# Patient Record
Sex: MALE | Race: WHITE | NOT HISPANIC OR LATINO | Employment: OTHER | URBAN - METROPOLITAN AREA
[De-identification: names, ages, dates, MRNs, and addresses within clinical notes are randomized per-mention and may not be internally consistent; named-entity substitution may affect disease eponyms.]

---

## 2023-04-04 PROBLEM — M25.511 CHRONIC RIGHT SHOULDER PAIN: Status: ACTIVE | Noted: 2023-04-04

## 2023-04-04 PROBLEM — G89.29 CHRONIC LOW BACK PAIN: Status: ACTIVE | Noted: 2023-04-04

## 2023-04-04 PROBLEM — I10 BENIGN HYPERTENSION: Status: ACTIVE | Noted: 2023-04-04

## 2023-04-04 PROBLEM — M25.50 JOINT PAIN: Status: ACTIVE | Noted: 2023-04-04

## 2023-04-04 PROBLEM — L97.929 LEG ULCER, LEFT (MULTI): Status: ACTIVE | Noted: 2023-04-04

## 2023-04-04 PROBLEM — R94.31 ABNORMAL EKG: Status: ACTIVE | Noted: 2023-04-04

## 2023-04-04 PROBLEM — R41.3 MEMORY LOSS, SHORT TERM: Status: ACTIVE | Noted: 2023-04-04

## 2023-04-04 PROBLEM — M47.816 LUMBAR SPONDYLOSIS: Status: ACTIVE | Noted: 2023-04-04

## 2023-04-04 PROBLEM — F41.1 GAD (GENERALIZED ANXIETY DISORDER): Status: ACTIVE | Noted: 2023-04-04

## 2023-04-04 PROBLEM — N40.1 PROSTATE HYPERPLASIA WITH URINARY OBSTRUCTION: Status: ACTIVE | Noted: 2023-04-04

## 2023-04-04 PROBLEM — E66.812 CLASS 2 OBESITY WITH BODY MASS INDEX (BMI) OF 36.0 TO 36.9 IN ADULT: Status: ACTIVE | Noted: 2023-04-04

## 2023-04-04 PROBLEM — M54.6 PAIN IN THORACIC SPINE: Status: ACTIVE | Noted: 2023-04-04

## 2023-04-04 PROBLEM — M54.50 CHRONIC LOW BACK PAIN: Status: ACTIVE | Noted: 2023-04-04

## 2023-04-04 PROBLEM — S06.9XAA TBI (TRAUMATIC BRAIN INJURY) (MULTI): Status: ACTIVE | Noted: 2023-04-04

## 2023-04-04 PROBLEM — M25.512 SHOULDER PAIN, LEFT: Status: ACTIVE | Noted: 2023-04-04

## 2023-04-04 PROBLEM — E55.9 VITAMIN D DEFICIENCY: Status: ACTIVE | Noted: 2023-04-04

## 2023-04-04 PROBLEM — G44.049 CHRONIC PAROXYSMAL HEMICRANIA, NOT INTRACTABLE: Status: ACTIVE | Noted: 2023-04-04

## 2023-04-04 PROBLEM — T78.40XA ALLERGIC REACTION: Status: ACTIVE | Noted: 2023-04-04

## 2023-04-04 PROBLEM — M54.16 LUMBAR RADICULOPATHY, CHRONIC: Status: ACTIVE | Noted: 2023-04-04

## 2023-04-04 PROBLEM — H90.3 ASYMMETRIC SNHL (SENSORINEURAL HEARING LOSS): Status: ACTIVE | Noted: 2023-04-04

## 2023-04-04 PROBLEM — G43.909 MIGRAINES: Status: ACTIVE | Noted: 2023-04-04

## 2023-04-04 PROBLEM — H81.09 LABYRINTHINE VERTIGO: Status: ACTIVE | Noted: 2023-04-04

## 2023-04-04 PROBLEM — G89.29 CHRONIC RIGHT SHOULDER PAIN: Status: ACTIVE | Noted: 2023-04-04

## 2023-04-04 PROBLEM — M48.062 SPINAL STENOSIS OF LUMBAR REGION WITH NEUROGENIC CLAUDICATION: Status: ACTIVE | Noted: 2023-04-04

## 2023-04-04 PROBLEM — F43.10 POSTTRAUMATIC STRESS DISORDER: Status: ACTIVE | Noted: 2023-04-04

## 2023-04-04 PROBLEM — F41.0 PANIC DISORDER: Status: ACTIVE | Noted: 2023-04-04

## 2023-04-04 PROBLEM — E66.9 CLASS 2 OBESITY WITH BODY MASS INDEX (BMI) OF 36.0 TO 36.9 IN ADULT: Status: ACTIVE | Noted: 2023-04-04

## 2023-04-04 PROBLEM — N13.8 PROSTATE HYPERPLASIA WITH URINARY OBSTRUCTION: Status: ACTIVE | Noted: 2023-04-04

## 2023-04-04 PROBLEM — H81.09 MENIERE'S VERTIGO: Status: ACTIVE | Noted: 2023-04-04

## 2023-04-04 PROBLEM — N52.9 ED (ERECTILE DYSFUNCTION): Status: ACTIVE | Noted: 2023-04-04

## 2023-04-04 PROBLEM — L29.9 PRURITUS: Status: ACTIVE | Noted: 2023-04-04

## 2023-04-04 PROBLEM — F41.8 DEPRESSION WITH ANXIETY: Status: ACTIVE | Noted: 2023-04-04

## 2023-04-04 RX ORDER — ATENOLOL 25 MG/1
12.5 TABLET ORAL EVERY OTHER DAY
COMMUNITY
Start: 2017-10-26

## 2023-04-04 RX ORDER — DIAZEPAM 2 MG/1
2 TABLET ORAL 2 TIMES DAILY PRN
COMMUNITY
Start: 2017-10-26 | End: 2023-05-08 | Stop reason: SDUPTHER

## 2023-04-04 RX ORDER — SERTRALINE HYDROCHLORIDE 100 MG/1
100 TABLET, FILM COATED ORAL DAILY
COMMUNITY
Start: 2017-10-26 | End: 2023-12-05 | Stop reason: ALTCHOICE

## 2023-04-04 RX ORDER — ACETAMINOPHEN, ASPIRIN (NSAID), AND CAFFEINE 250; 250; 65 MG/1; MG/1; MG/1
1 TABLET, FILM COATED ORAL 3 TIMES DAILY PRN
COMMUNITY
Start: 2023-01-10 | End: 2023-05-03

## 2023-04-04 RX ORDER — GABAPENTIN 300 MG/1
CAPSULE ORAL
COMMUNITY
Start: 2023-02-07 | End: 2023-12-05 | Stop reason: ALTCHOICE

## 2023-04-04 RX ORDER — DIVALPROEX SODIUM 125 MG/1
1 TABLET, DELAYED RELEASE ORAL EVERY 12 HOURS
COMMUNITY
Start: 2023-01-10 | End: 2023-12-05 | Stop reason: ALTCHOICE

## 2023-04-04 RX ORDER — NABUMETONE 500 MG/1
1000 TABLET, FILM COATED ORAL AS NEEDED
COMMUNITY
Start: 2022-01-21 | End: 2023-12-05 | Stop reason: ALTCHOICE

## 2023-04-04 RX ORDER — ERGOCALCIFEROL 1.25 MG/1
1 CAPSULE ORAL
COMMUNITY
Start: 2016-04-29 | End: 2023-12-05 | Stop reason: ALTCHOICE

## 2023-04-04 RX ORDER — TAMSULOSIN HYDROCHLORIDE 0.4 MG/1
0.8 CAPSULE ORAL NIGHTLY
COMMUNITY
Start: 2022-04-07 | End: 2023-05-03

## 2023-04-04 RX ORDER — TADALAFIL 10 MG/1
10 TABLET ORAL AS NEEDED
COMMUNITY
Start: 2022-04-07

## 2023-04-05 ENCOUNTER — OFFICE VISIT (OUTPATIENT)
Dept: PRIMARY CARE | Facility: CLINIC | Age: 63
End: 2023-04-05
Payer: COMMERCIAL

## 2023-04-05 VITALS
HEIGHT: 68 IN | HEART RATE: 76 BPM | OXYGEN SATURATION: 93 % | BODY MASS INDEX: 36.37 KG/M2 | WEIGHT: 240 LBS | SYSTOLIC BLOOD PRESSURE: 120 MMHG | DIASTOLIC BLOOD PRESSURE: 77 MMHG | TEMPERATURE: 97.3 F

## 2023-04-05 DIAGNOSIS — R14.0 NON-GASEOUS ABDOMINAL DISTENTION: Primary | ICD-10-CM

## 2023-04-05 DIAGNOSIS — L97.919 ULCER OF RIGHT LOWER EXTREMITY, UNSPECIFIED ULCER STAGE (MULTI): ICD-10-CM

## 2023-04-05 DIAGNOSIS — R63.5 WEIGHT GAIN, ABNORMAL: ICD-10-CM

## 2023-04-05 DIAGNOSIS — R73.9 HYPERGLYCEMIA: ICD-10-CM

## 2023-04-05 PROCEDURE — 99214 OFFICE O/P EST MOD 30 MIN: CPT | Performed by: INTERNAL MEDICINE

## 2023-04-05 PROCEDURE — 3074F SYST BP LT 130 MM HG: CPT | Performed by: INTERNAL MEDICINE

## 2023-04-05 PROCEDURE — 1036F TOBACCO NON-USER: CPT | Performed by: INTERNAL MEDICINE

## 2023-04-05 PROCEDURE — 3078F DIAST BP <80 MM HG: CPT | Performed by: INTERNAL MEDICINE

## 2023-04-05 RX ORDER — MUPIROCIN CALCIUM 20 MG/G
CREAM TOPICAL 2 TIMES DAILY
Qty: 30 G | Refills: 2 | Status: SHIPPED | OUTPATIENT
Start: 2023-04-05 | End: 2023-04-20

## 2023-04-05 RX ORDER — SULFAMETHOXAZOLE AND TRIMETHOPRIM 800; 160 MG/1; MG/1
1 TABLET ORAL 2 TIMES DAILY
COMMUNITY
Start: 2022-07-18 | End: 2023-05-03 | Stop reason: ALTCHOICE

## 2023-04-05 RX ORDER — FINASTERIDE 5 MG/1
1 TABLET, FILM COATED ORAL DAILY
COMMUNITY
Start: 2022-05-04 | End: 2023-12-05 | Stop reason: ALTCHOICE

## 2023-04-05 RX ORDER — HYDROXYZINE HYDROCHLORIDE 25 MG/1
25 TABLET, FILM COATED ORAL NIGHTLY
COMMUNITY
Start: 2022-11-01 | End: 2023-12-05 | Stop reason: ALTCHOICE

## 2023-04-05 RX ORDER — PREDNISONE 20 MG/1
TABLET ORAL
COMMUNITY
Start: 2022-07-18 | End: 2023-05-03 | Stop reason: ALTCHOICE

## 2023-04-05 RX ORDER — CHOLECALCIFEROL (VITAMIN D3) 125 MCG
1 CAPSULE ORAL EVERY OTHER DAY
COMMUNITY
Start: 2023-01-10 | End: 2023-05-03

## 2023-04-05 RX ORDER — CYCLOBENZAPRINE HCL 5 MG
TABLET ORAL
COMMUNITY
Start: 2022-12-13 | End: 2023-12-05 | Stop reason: ALTCHOICE

## 2023-04-05 RX ORDER — TIZANIDINE 2 MG/1
TABLET ORAL
COMMUNITY
Start: 2022-09-30 | End: 2023-07-10 | Stop reason: WASHOUT

## 2023-04-05 RX ORDER — MUPIROCIN 20 MG/G
OINTMENT TOPICAL 2 TIMES DAILY
COMMUNITY
Start: 2022-09-21 | End: 2023-04-06 | Stop reason: SDUPTHER

## 2023-04-05 ASSESSMENT — LIFESTYLE VARIABLES
AUDIT-C TOTAL SCORE: 0
HOW OFTEN DO YOU HAVE A DRINK CONTAINING ALCOHOL: NEVER
HOW MANY STANDARD DRINKS CONTAINING ALCOHOL DO YOU HAVE ON A TYPICAL DAY: PATIENT DOES NOT DRINK
SKIP TO QUESTIONS 9-10: 1
HOW OFTEN DO YOU HAVE SIX OR MORE DRINKS ON ONE OCCASION: NEVER

## 2023-04-05 ASSESSMENT — COLUMBIA-SUICIDE SEVERITY RATING SCALE - C-SSRS: 1. IN THE PAST MONTH, HAVE YOU WISHED YOU WERE DEAD OR WISHED YOU COULD GO TO SLEEP AND NOT WAKE UP?: NO

## 2023-04-05 ASSESSMENT — PAIN SCALES - GENERAL: PAINLEVEL: 2

## 2023-04-05 ASSESSMENT — PATIENT HEALTH QUESTIONNAIRE - PHQ9
2. FEELING DOWN, DEPRESSED OR HOPELESS: NOT AT ALL
SUM OF ALL RESPONSES TO PHQ9 QUESTIONS 1 AND 2: 0
1. LITTLE INTEREST OR PLEASURE IN DOING THINGS: NOT AT ALL

## 2023-04-05 NOTE — PROGRESS NOTES
"Subjective   Patient ID: Antonino Banks is a 62 y.o. male who presents for Diabetes (Would like to be checked ), dermotolgy, and Weight Check.    HPI     Review of Systems    Objective   /77   Pulse 76   Temp 36.3 °C (97.3 °F)   Ht 1.727 m (5' 8\")   Wt 109 kg (240 lb)   SpO2 93%   BMI 36.49 kg/m²     Physical Exam    Assessment/Plan          "

## 2023-04-05 NOTE — PROGRESS NOTES
"Subjective   Patient ID: Antonino Banks is a 62 y.o. male who presents for Diabetes (Would like to be checked ), dermotolgy, and Weight Check.    HPI patient presents to clinic as problem visit because of abdominal distention and weight gain of 15 pounds over the past few weeks.  He has also noticed few ulceration of his right lower leg over the past few weeks.  He denies any nausea, vomiting, GI bleeding, fever chills and jaundice.  He has past history of hypertension, traumatic brain injury, chronic low back pain secondary to lumbar canal stenosis, migraine headaches, ulceration of legs, coronary atherosclerosis, Ménière's disease BPH, posttraumatic stress disorder and vertigo.  Weight gain of 15 ils ,leg ulcers of right leg weight     Review of Systems   Constitutional: Negative.    HENT: Negative.     Eyes: Negative.    Respiratory: Negative.     Cardiovascular: Negative.    Gastrointestinal: Negative.    Endocrine: Negative.    Genitourinary: Negative.    Musculoskeletal: Negative.    Skin:  Positive for rash.   Allergic/Immunologic: Negative.    Neurological: Negative.    Hematological: Negative.    Psychiatric/Behavioral: Negative.         Objective   /77   Pulse 76   Temp 36.3 °C (97.3 °F)   Ht 1.727 m (5' 8\")   Wt 109 kg (240 lb)   SpO2 93%   BMI 36.49 kg/m²     Physical Exam  Constitutional:       Appearance: Normal appearance. He is obese.   HENT:      Right Ear: Tympanic membrane normal.      Left Ear: Tympanic membrane and ear canal normal.      Nose: Nose normal.   Neck:      Vascular: No carotid bruit.   Cardiovascular:      Rate and Rhythm: Normal rate.   Pulmonary:      Effort: No respiratory distress.      Breath sounds: No stridor. No wheezing.   Abdominal:      Palpations: Abdomen is soft.      Tenderness: There is no guarding or rebound.   Skin:     Coloration: Skin is not jaundiced.      Findings: No bruising.   Neurological:      General: No focal deficit present.      Mental " Status: He is alert and oriented to person, place, and time.   Psychiatric:         Mood and Affect: Mood normal.         Assessment/Plan    patient will be scheduled for routine blood work including ultrasound abdomen in view of distention.  He will be started on Bactroban ointment regarding ulceration of the right leg.  He is encouraged to diet, exercise and do lifestyle modification for obesity.  He will be notified about test results and will make further recommendations.

## 2023-04-06 ENCOUNTER — LAB (OUTPATIENT)
Dept: LAB | Facility: LAB | Age: 63
End: 2023-04-06
Payer: COMMERCIAL

## 2023-04-06 DIAGNOSIS — R14.0 NON-GASEOUS ABDOMINAL DISTENTION: ICD-10-CM

## 2023-04-06 DIAGNOSIS — R63.5 WEIGHT GAIN, ABNORMAL: ICD-10-CM

## 2023-04-06 DIAGNOSIS — R21 RASH: Primary | ICD-10-CM

## 2023-04-06 DIAGNOSIS — R73.9 HYPERGLYCEMIA: ICD-10-CM

## 2023-04-06 LAB
APPEARANCE, URINE: CLEAR
BILIRUBIN, URINE: NEGATIVE
BLOOD, URINE: NEGATIVE
COLOR, URINE: NORMAL
ESTIMATED AVERAGE GLUCOSE FOR HBA1C: 126 MG/DL
GLUCOSE, URINE: NEGATIVE MG/DL
HEMOGLOBIN A1C/HEMOGLOBIN TOTAL IN BLOOD: 6 %
KETONES, URINE: NEGATIVE MG/DL
LEUKOCYTE ESTERASE, URINE: NEGATIVE
NITRITE, URINE: NEGATIVE
PH, URINE: 5 (ref 5–8)
PROTEIN, URINE: NEGATIVE MG/DL
SPECIFIC GRAVITY, URINE: 1.01 (ref 1–1.03)
UROBILINOGEN, URINE: <2 MG/DL (ref 0–1.9)

## 2023-04-06 PROCEDURE — 83036 HEMOGLOBIN GLYCOSYLATED A1C: CPT

## 2023-04-06 PROCEDURE — 36415 COLL VENOUS BLD VENIPUNCTURE: CPT

## 2023-04-06 PROCEDURE — 81003 URINALYSIS AUTO W/O SCOPE: CPT

## 2023-04-06 PROCEDURE — 80053 COMPREHEN METABOLIC PANEL: CPT

## 2023-04-06 RX ORDER — MUPIROCIN 20 MG/G
OINTMENT TOPICAL 2 TIMES DAILY
Qty: 15 G | Refills: 2 | Status: SHIPPED | OUTPATIENT
Start: 2023-04-06 | End: 2023-04-16

## 2023-04-07 LAB
ALANINE AMINOTRANSFERASE (SGPT) (U/L) IN SER/PLAS: 20 U/L (ref 10–52)
ALBUMIN (G/DL) IN SER/PLAS: 4 G/DL (ref 3.4–5)
ALKALINE PHOSPHATASE (U/L) IN SER/PLAS: 62 U/L (ref 33–136)
ANION GAP IN SER/PLAS: 13 MMOL/L (ref 10–20)
ASPARTATE AMINOTRANSFERASE (SGOT) (U/L) IN SER/PLAS: 16 U/L (ref 9–39)
BILIRUBIN TOTAL (MG/DL) IN SER/PLAS: 0.3 MG/DL (ref 0–1.2)
CALCIUM (MG/DL) IN SER/PLAS: 9 MG/DL (ref 8.6–10.6)
CARBON DIOXIDE, TOTAL (MMOL/L) IN SER/PLAS: 26 MMOL/L (ref 21–32)
CHLORIDE (MMOL/L) IN SER/PLAS: 105 MMOL/L (ref 98–107)
CREATININE (MG/DL) IN SER/PLAS: 0.89 MG/DL (ref 0.5–1.3)
GFR MALE: >90 ML/MIN/1.73M2
GLUCOSE (MG/DL) IN SER/PLAS: 94 MG/DL (ref 74–99)
POTASSIUM (MMOL/L) IN SER/PLAS: 4.7 MMOL/L (ref 3.5–5.3)
PROTEIN TOTAL: 6.3 G/DL (ref 6.4–8.2)
SODIUM (MMOL/L) IN SER/PLAS: 139 MMOL/L (ref 136–145)
UREA NITROGEN (MG/DL) IN SER/PLAS: 12 MG/DL (ref 6–23)

## 2023-04-08 ENCOUNTER — APPOINTMENT (OUTPATIENT)
Dept: PRIMARY CARE | Facility: CLINIC | Age: 63
End: 2023-04-08
Payer: COMMERCIAL

## 2023-04-09 ASSESSMENT — ENCOUNTER SYMPTOMS
CONSTITUTIONAL NEGATIVE: 1
HEMATOLOGIC/LYMPHATIC NEGATIVE: 1
NEUROLOGICAL NEGATIVE: 1
GASTROINTESTINAL NEGATIVE: 1
CARDIOVASCULAR NEGATIVE: 1
ALLERGIC/IMMUNOLOGIC NEGATIVE: 1
RESPIRATORY NEGATIVE: 1
MUSCULOSKELETAL NEGATIVE: 1
PSYCHIATRIC NEGATIVE: 1
EYES NEGATIVE: 1
ENDOCRINE NEGATIVE: 1

## 2023-04-27 ENCOUNTER — APPOINTMENT (OUTPATIENT)
Dept: PRIMARY CARE | Facility: CLINIC | Age: 63
End: 2023-04-27
Payer: COMMERCIAL

## 2023-05-03 ENCOUNTER — OFFICE VISIT (OUTPATIENT)
Dept: PRIMARY CARE | Facility: CLINIC | Age: 63
End: 2023-05-03
Payer: COMMERCIAL

## 2023-05-03 ENCOUNTER — TELEPHONE (OUTPATIENT)
Dept: PRIMARY CARE | Facility: CLINIC | Age: 63
End: 2023-05-03

## 2023-05-03 ENCOUNTER — LAB (OUTPATIENT)
Dept: LAB | Facility: LAB | Age: 63
End: 2023-05-03
Payer: COMMERCIAL

## 2023-05-03 VITALS
HEART RATE: 92 BPM | RESPIRATION RATE: 17 BRPM | OXYGEN SATURATION: 93 % | SYSTOLIC BLOOD PRESSURE: 115 MMHG | HEIGHT: 68 IN | BODY MASS INDEX: 37.28 KG/M2 | DIASTOLIC BLOOD PRESSURE: 83 MMHG | WEIGHT: 246 LBS

## 2023-05-03 DIAGNOSIS — R91.1 LUNG NODULE: ICD-10-CM

## 2023-05-03 DIAGNOSIS — H81.09 LABYRINTHINE VERTIGO, UNSPECIFIED LATERALITY: ICD-10-CM

## 2023-05-03 DIAGNOSIS — R06.2 WHEEZING: ICD-10-CM

## 2023-05-03 DIAGNOSIS — L29.9 ITCHING: ICD-10-CM

## 2023-05-03 DIAGNOSIS — J18.9 PNEUMONIA DUE TO INFECTIOUS ORGANISM, UNSPECIFIED LATERALITY, UNSPECIFIED PART OF LUNG: ICD-10-CM

## 2023-05-03 DIAGNOSIS — M54.16 LUMBAR RADICULOPATHY, CHRONIC: ICD-10-CM

## 2023-05-03 DIAGNOSIS — R26.89 BALANCE DISORDER: Primary | ICD-10-CM

## 2023-05-03 DIAGNOSIS — R06.2 WHEEZING: Primary | ICD-10-CM

## 2023-05-03 PROCEDURE — 36415 COLL VENOUS BLD VENIPUNCTURE: CPT

## 2023-05-03 PROCEDURE — 82785 ASSAY OF IGE: CPT

## 2023-05-03 PROCEDURE — 1036F TOBACCO NON-USER: CPT | Performed by: INTERNAL MEDICINE

## 2023-05-03 PROCEDURE — 86003 ALLG SPEC IGE CRUDE XTRC EA: CPT

## 2023-05-03 PROCEDURE — 99214 OFFICE O/P EST MOD 30 MIN: CPT | Performed by: INTERNAL MEDICINE

## 2023-05-03 PROCEDURE — 3074F SYST BP LT 130 MM HG: CPT | Performed by: INTERNAL MEDICINE

## 2023-05-03 PROCEDURE — 3079F DIAST BP 80-89 MM HG: CPT | Performed by: INTERNAL MEDICINE

## 2023-05-03 RX ORDER — ALBUTEROL SULFATE 90 UG/1
2 AEROSOL, METERED RESPIRATORY (INHALATION) EVERY 4 HOURS PRN
Qty: 6.7 G | Refills: 3 | Status: SHIPPED
Start: 2023-05-03 | End: 2023-07-10 | Stop reason: SDUPTHER

## 2023-05-03 ASSESSMENT — ENCOUNTER SYMPTOMS: DEPRESSION: 0

## 2023-05-03 ASSESSMENT — PATIENT HEALTH QUESTIONNAIRE - PHQ9
SUM OF ALL RESPONSES TO PHQ9 QUESTIONS 1 AND 2: 0
2. FEELING DOWN, DEPRESSED OR HOPELESS: NOT AT ALL
1. LITTLE INTEREST OR PLEASURE IN DOING THINGS: NOT AT ALL

## 2023-05-03 NOTE — PROGRESS NOTES
Subjective   Patient ID: Antonino Banks is a 62 y.o. male who presents for Follow-up (Er fuv ([facility]), pt had allergic reaction to mold and mildew. Pt asking for a CT bc pt has nodule on left lung.).  HPI    Er follow up  co  mold  mildew  believed to be in home  indur floor causing respiratory.  Wants  ct lung  due to  a nodule .   Co itching wheezing  3 weeks.  Better with prednisone  albuterol and  antibiotic.  Touch of  pneumonia  and  nod on left lung.   April 14 at   ed.  Cough and  wheezing  just a little  bit.   Cardiac charles neg.     Lov  nov 2022   Drug screen  6/24/ 22   Csa  9/21/22    No hx of  cig smoking tob use.   Smoke  marijuana  5  year in his  20's   Feels he was exposed to grinding  dust.     OARRS:  No data recorded  I have personally reviewed the OARRS report for Antonino Banks. I have considered the risks of abuse, dependence, addiction and diversion    Is the patient prescribed a combination of a benzodiazepine and opioid?  Yes, I feel it is clincially indicated to continue the medication and have discussed with the patient risks/benefits/alternatives.    Last Urine Drug Screen / ordered today: Yes  Recent Results (from the past 71614 hour(s))   OPIATE/OPIOID/BENZO PRESCRIPTION COMPLIANCE    Collection Time: 06/24/22 12:19 PM   Result Value Ref Range    DRUG SCREEN COMMENT URINE SEE BELOW     Creatine, Urine 214.6 mg/dL    Amphetamine Screen, Urine PRESUMPTIVE NEGATIVE NEGATIVE    Barbiturate Screen, Urine PRESUMPTIVE NEGATIVE NEGATIVE    Cannabinoid Screen, Urine PRESUMPTIVE NEGATIVE NEGATIVE    Cocaine Screen, Urine PRESUMPTIVE NEGATIVE NEGATIVE    PCP Screen, Urine PRESUMPTIVE NEGATIVE NEGATIVE    7-Aminoclonazepam <25 Cutoff <25 ng/mL    Alpha-Hydroxyalprazolam <25 Cutoff <25 ng/mL    Alpha-Hydroxymidazolam <25 Cutoff <25 ng/mL    Alprazolam <25 Cutoff <25 ng/mL    Chlordiazepoxide <25 Cutoff <25 ng/mL    Clonazepam <25 Cutoff <25 ng/mL    Diazepam <25 Cutoff <25 ng/mL     Lorazepam <25 Cutoff <25 ng/mL    Midazolam <25 Cutoff <25 ng/mL    Nordiazepam 163 (A) Cutoff <25 ng/mL    Oxazepam 706 (A) Cutoff <25 ng/mL    Temazepam >1000 (A) Cutoff <25 ng/mL    Zolpidem <25 Cutoff <25 ng/mL    Zolpidem Metabolite (ZCA) <25 Cutoff <25 ng/mL    6-Acetylmorphine <25 Cutoff <25 ng/mL    Codeine <50 Cutoff <50 ng/mL    Hydrocodone <25 Cutoff <25 ng/mL    Hydromorphone <25 Cutoff <25 ng/mL    Morphine Urine <50 Cutoff <50 ng/mL    Norhydrocodone <25 Cutoff <25 ng/mL    Noroxycodone <25 Cutoff <25 ng/mL    Oxycodone <25 Cutoff <25 ng/mL    Oxymorphone <25 Cutoff <25 ng/mL    Tramadol <50 Cutoff <50 ng/mL    O-Desmethyltramadol <50 Cutoff <50 ng/mL    Fentanyl <2.5 Cutoff<2.5 ng/mL    Norfentanyl <2.5 Cutoff<2.5 ng/mL    METHADONE CONFIRMATION,URINE <25 Cutoff <25 ng/mL    EDDP <25 Cutoff <25 ng/mL     Results are as expected.     Controlled Substance Agreement:  Date of the Last Agreement: 9 22 22   Reviewed Controlled Substance Agreement including but not limited to the benefits, risks, and alternatives to treatment with a Controlled Substance medication(s).    Benzodiazepines:  What is the patient's goal of therapy? Treat  vertigo  refractory to meclizine.   Is this being achieved with current treatment? yes    YOGI-7:  No data recorded    Activities of Daily Living:   Is your overall impression that this patient is benefiting (symptom reduction outweighs side effects) from benzodiazepine therapy? Yes     1. Physical Functioning: Better  2. Family Relationship: Better  3. Social Relationship: Better  4. Mood: Better  5. Sleep Patterns: Better  6. Overall Function: Better      Review of Systems    Objective   Physical Exam  Vitals and nursing note reviewed. Exam conducted with a chaperone present.   Constitutional:       Appearance: Normal appearance.   HENT:      Head: Normocephalic and atraumatic.      Right Ear: Tympanic membrane, ear canal and external ear normal.      Left Ear: Tympanic  membrane, ear canal and external ear normal.      Nose: Nose normal.      Mouth/Throat:      Mouth: Mucous membranes are moist.      Pharynx: Oropharynx is clear.   Eyes:      Extraocular Movements: Extraocular movements intact.      Conjunctiva/sclera: Conjunctivae normal.      Pupils: Pupils are equal, round, and reactive to light.   Cardiovascular:      Rate and Rhythm: Normal rate and regular rhythm.      Pulses: Normal pulses.      Heart sounds: Normal heart sounds.   Pulmonary:      Effort: Pulmonary effort is normal. No respiratory distress.      Breath sounds: Normal breath sounds. No stridor. No wheezing, rhonchi or rales.   Chest:      Chest wall: No tenderness.   Musculoskeletal:         General: Normal range of motion.      Cervical back: Neck supple.   Skin:     General: Skin is warm and dry.      Capillary Refill: Capillary refill takes 2 to 3 seconds.   Neurological:      General: No focal deficit present.      Mental Status: He is alert and oriented to person, place, and time. Mental status is at baseline.      Gait: Gait abnormal.      Comments: Using  cane    Psychiatric:         Mood and Affect: Mood normal.         Behavior: Behavior normal.         Thought Content: Thought content normal.         Judgment: Judgment normal.         Assessment/Plan   Problem List Items Addressed This Visit          Medium    Labyrinthine vertigo     Other Visit Diagnoses       Wheezing    -  Primary    Relevant Medications    albuterol (Proventil HFA) 90 mcg/actuation inhaler    Other Relevant Orders    Respiratory Allergy Profile IgE    Itching        Relevant Orders    Respiratory Allergy Profile IgE    Lung nodule        Relevant Orders    CT chest wo IV contrast    Pneumonia due to infectious organism, unspecified laterality, unspecified part of lung        Relevant Medications    albuterol (Proventil HFA) 90 mcg/actuation inhaler    Other Relevant Orders    CT chest wo IV contrast             Possible   allergy check ige to mold and resp pathogens.   Clinically  improved pneumonia after  doxycycline .  Check chest image.     Chronic problems controlled  on current treatment  unless otherwise noted.     CHART  REVIEWED AND  RECONCILED WITH OLD DATA / UPDATED IN NEW SYSTEM:  MEDS,  PROBLEMS,  ALLERGIES, SOC HISTORY.

## 2023-05-04 LAB
ALLERGEN ANIMAL: CAT DANDER IGE (KU/L): <0.1 KU/L
ALLERGEN ANIMAL: DOG DANDER IGE (KU/L): <0.1 KU/L
ALLERGEN GRASS: BERMUDA GRASS (CYNODON DACTYLON) IGE (KU/L): <0.1 KU/L
ALLERGEN GRASS: JOHNSON GRASS (SORGHUM HALEPENSE) IGE (KU/L): <0.1 KU/L
ALLERGEN GRASS: MEADOW GRASS, KENTUCKY BLUE (POA PRATENSIS )IGE (KU/L): <0.1 KU/L
ALLERGEN GRASS: TIMOTHY GRASS (PHLEUM PRATENSE) IGE (KU/L): <0.1 KU/L
ALLERGEN INSECT: COCKROACH IGE: <0.1 KU/L
ALLERGEN MICROORGANISM: ALTERNARIA ALTERNATA IGE (KU/L): <0.1 KU/L
ALLERGEN MICROORGANISM: ASPERGILLUS FUMIGATUS IGE (KU/L): <0.1 KU/L
ALLERGEN MICROORGANISM: CLADOSPORIUM HERBARUM IGE (KU/L): <0.1 KU/L
ALLERGEN MICROORGANISM: PENICILLIUM CHRYSOGENUM (P. NOTATUM) IGE (KU/L): <0.1 KU/L
ALLERGEN MITE: DERMATOPHAGOIDES FARINAE (HOUSE DUST MITE) IGE (KU/L): 1.7 KU/L
ALLERGEN MITE: DERMATOPHAGOIDES PTERONYSSINUS (HOUSE DUST MITE) IGE (KU/L): 0.94 KU/L
ALLERGEN TREE: BOX-ELDER (ACER NEGUNDO) IGE (KU/L): <0.1 KU/L
ALLERGEN TREE: COMMON SILVER BIRCH (BETULA VERRUCOSA) IGE (KU/L): <0.1 KU/L
ALLERGEN TREE: COTTONWOOD (POPULUS DELTOIDES) IGE (KU/L): <0.1 KU/L
ALLERGEN TREE: ELM (ULMUS AMERICANA) IGE (KU/L): <0.1 KU/L
ALLERGEN TREE: MAPLE LEAF SYCAMORE, LONDON PLANE IGE (KU/L): <0.1 KU/L
ALLERGEN TREE: MOUNTAIN JUNIPER (JUNIPERUS SABINOIDES) IGE (KU/L): <0.1 KU/L
ALLERGEN TREE: MULBERRY (MORUS ALBA) IGE (KU/L): <0.1 KU/L
ALLERGEN TREE: OAK (QUERCUS ALBA) IGE (KU/L): <0.1 KU/L
ALLERGEN TREE: PECAN, HICKORY (CARYA PECAN) IGE (KU/L): <0.1 KU/L
ALLERGEN TREE: WALNUT IGE: <0.1 KU/L
ALLERGEN TREE: WHITE ASH (FRAXINUS AMERICANA) IGE (KU/L): <0.1 KU/L
ALLERGEN WEED: COMMON PIGWEED (AMARANTHUS RETROFLEXUS) IGE (KU/L): <0.1 KU/L
ALLERGEN WEED: COMMON RAGWEED (AMB. ARTEMISIIFOLIA/A. ELATIOR) IGE (KU/L): <0.1 KU/L
ALLERGEN WEED: GOOSEFOOT, LAMB'S QUARTERS (CHENOPODIUM ALBUM) IGE (KU/L): <0.1 KU/L
ALLERGEN WEED: PLANTAIN (ENGLISH), RIBWORT (PLANTAGO LANCEOLATA) IGE (KU/L): <0.1 KU/L
ALLERGEN WEED: PRICKLY SALTWORT/RUSSIAN THISTLE (SALSOLA KALI) IGE (KU/L): <0.1 KU/L
ALLERGEN WEED: SHEEP SORREL (RUMEX ACETOSELLA) IGE (KU/L): <0.1 KU/L
IMMUNOCAP IGE: 113 KU/L (ref 0–214)
IMMUNOCAP INTERPRETATION: ABNORMAL

## 2023-05-08 DIAGNOSIS — H81.09 LABYRINTHINE VERTIGO, UNSPECIFIED LATERALITY: Primary | ICD-10-CM

## 2023-05-08 RX ORDER — DIAZEPAM 2 MG/1
2 TABLET ORAL DAILY PRN
Qty: 30 TABLET | Refills: 2 | Status: SHIPPED | OUTPATIENT
Start: 2023-05-08 | End: 2023-12-05 | Stop reason: ALTCHOICE

## 2023-05-12 ENCOUNTER — APPOINTMENT (OUTPATIENT)
Dept: PRIMARY CARE | Facility: CLINIC | Age: 63
End: 2023-05-12
Payer: COMMERCIAL

## 2023-05-19 ENCOUNTER — HOSPITAL ENCOUNTER (OUTPATIENT)
Dept: DATA CONVERSION | Facility: HOSPITAL | Age: 63
End: 2023-05-19
Attending: PHYSICAL MEDICINE & REHABILITATION | Admitting: PHYSICAL MEDICINE & REHABILITATION
Payer: COMMERCIAL

## 2023-05-19 DIAGNOSIS — M46.1 SACROILIITIS, NOT ELSEWHERE CLASSIFIED (CMS-HCC): ICD-10-CM

## 2023-06-07 ENCOUNTER — HOSPITAL ENCOUNTER (OUTPATIENT)
Dept: DATA CONVERSION | Facility: HOSPITAL | Age: 63
End: 2023-06-07
Attending: PHYSICAL MEDICINE & REHABILITATION | Admitting: PHYSICAL MEDICINE & REHABILITATION
Payer: COMMERCIAL

## 2023-06-07 DIAGNOSIS — M48.062 SPINAL STENOSIS, LUMBAR REGION WITH NEUROGENIC CLAUDICATION: ICD-10-CM

## 2023-07-10 ENCOUNTER — OFFICE VISIT (OUTPATIENT)
Dept: PRIMARY CARE | Facility: CLINIC | Age: 63
End: 2023-07-10
Payer: COMMERCIAL

## 2023-07-10 VITALS
OXYGEN SATURATION: 93 % | HEIGHT: 68 IN | HEART RATE: 84 BPM | BODY MASS INDEX: 36.1 KG/M2 | SYSTOLIC BLOOD PRESSURE: 107 MMHG | WEIGHT: 238.2 LBS | DIASTOLIC BLOOD PRESSURE: 75 MMHG

## 2023-07-10 DIAGNOSIS — M54.2 CERVICAL MUSCLE PAIN: ICD-10-CM

## 2023-07-10 DIAGNOSIS — J18.9 PNEUMONIA DUE TO INFECTIOUS ORGANISM, UNSPECIFIED LATERALITY, UNSPECIFIED PART OF LUNG: ICD-10-CM

## 2023-07-10 DIAGNOSIS — R06.2 WHEEZING: ICD-10-CM

## 2023-07-10 DIAGNOSIS — J02.9 SORE THROAT: Primary | ICD-10-CM

## 2023-07-10 DIAGNOSIS — R06.00 DYSPNEA, UNSPECIFIED TYPE: ICD-10-CM

## 2023-07-10 PROCEDURE — 3078F DIAST BP <80 MM HG: CPT | Performed by: INTERNAL MEDICINE

## 2023-07-10 PROCEDURE — 1036F TOBACCO NON-USER: CPT | Performed by: INTERNAL MEDICINE

## 2023-07-10 PROCEDURE — 3074F SYST BP LT 130 MM HG: CPT | Performed by: INTERNAL MEDICINE

## 2023-07-10 PROCEDURE — 99213 OFFICE O/P EST LOW 20 MIN: CPT | Performed by: INTERNAL MEDICINE

## 2023-07-10 RX ORDER — ALBUTEROL SULFATE 90 UG/1
2 AEROSOL, METERED RESPIRATORY (INHALATION) EVERY 4 HOURS PRN
Qty: 6.7 G | Refills: 1 | Status: SHIPPED | OUTPATIENT
Start: 2023-07-10 | End: 2023-12-05 | Stop reason: ALTCHOICE

## 2023-07-10 ASSESSMENT — PATIENT HEALTH QUESTIONNAIRE - PHQ9
2. FEELING DOWN, DEPRESSED OR HOPELESS: NOT AT ALL
1. LITTLE INTEREST OR PLEASURE IN DOING THINGS: NOT AT ALL
SUM OF ALL RESPONSES TO PHQ9 QUESTIONS 1 AND 2: 0

## 2023-07-10 NOTE — PROGRESS NOTES
"Subjective   Patient ID: Antonino Banks is a 62 y.o. male who presents for Sore Throat (With pain and shortness of breath for over a week).    HPI     Review of Systems    Objective   /75   Pulse 84   Ht 1.727 m (5' 8\")   Wt 108 kg (238 lb 3.2 oz)   SpO2 93%   BMI 36.22 kg/m²     Physical Exam    Assessment/Plan          "

## 2023-07-10 NOTE — PROGRESS NOTES
"Subjective   Patient ID: Antonino Banks is a 62 y.o. male who presents for Sore Throat (With pain and shortness of breath for over a week).    HPI patient presents to clinic as problem visit because of scratchy throat, nonproductive cough and some shortness of breath going on for the past 1 week.  He feels his symptoms developed  after getting exposed to too much smoking here.  He denies any fever, chills, dysphagia, palpitation, chest tightness and diaphoresis.  He has history of  hypertension, traumatic brain injury, chronic low back pain secondary to lumbar canal stenosis, migraine headaches, ulceration of legs, coronary atherosclerosis, Ménière's disease BPH, hepatic steatosis,posttraumatic stress disorder and vertigo.       Review of Systems   Constitutional: Negative.    HENT:  Positive for sore throat.    Eyes: Negative.    Respiratory:  Positive for cough.    Cardiovascular: Negative.    Gastrointestinal: Negative.    Endocrine: Negative.    Genitourinary: Negative.    Musculoskeletal: Negative.    Skin: Negative.    Allergic/Immunologic: Negative.    Neurological: Negative.    Hematological: Negative.    Psychiatric/Behavioral: Negative.         Objective   /75   Pulse 84   Ht 1.727 m (5' 8\")   Wt 108 kg (238 lb 3.2 oz)   SpO2 93%   BMI 36.22 kg/m²     Physical Exam  Constitutional:       Appearance: Normal appearance. He is obese.   HENT:      Right Ear: Tympanic membrane normal.      Left Ear: Tympanic membrane and ear canal normal.      Nose: Nose normal.   Neck:      Vascular: No carotid bruit.   Cardiovascular:      Rate and Rhythm: Normal rate.   Pulmonary:      Effort: No respiratory distress.      Breath sounds: No stridor. No wheezing.   Abdominal:      Palpations: Abdomen is soft.      Tenderness: There is no abdominal tenderness. There is no guarding or rebound.   Skin:     Coloration: Skin is not jaundiced.   Neurological:      General: No focal deficit present.      Mental " Status: He is alert and oriented to person, place, and time.   Psychiatric:         Mood and Affect: Mood normal.         Assessment/Plan    patient is advised to do gargle with salt water 4 times daily and take Tylenol for pain.  He will continue albuterol inhaler as needed basis regarding shortness of breath.  He is given reassurance that he does not have any strep throat.  He will continue other home medications and will notify the clinic for any worsening of his symptoms.

## 2023-07-11 ASSESSMENT — ENCOUNTER SYMPTOMS
SORE THROAT: 1
NEUROLOGICAL NEGATIVE: 1
CARDIOVASCULAR NEGATIVE: 1
HEMATOLOGIC/LYMPHATIC NEGATIVE: 1
GASTROINTESTINAL NEGATIVE: 1
COUGH: 1
MUSCULOSKELETAL NEGATIVE: 1
CONSTITUTIONAL NEGATIVE: 1
EYES NEGATIVE: 1
ENDOCRINE NEGATIVE: 1
ALLERGIC/IMMUNOLOGIC NEGATIVE: 1
PSYCHIATRIC NEGATIVE: 1

## 2023-07-21 ENCOUNTER — HOSPITAL ENCOUNTER (OUTPATIENT)
Dept: DATA CONVERSION | Facility: HOSPITAL | Age: 63
End: 2023-07-21
Attending: PHYSICAL MEDICINE & REHABILITATION | Admitting: PHYSICAL MEDICINE & REHABILITATION
Payer: COMMERCIAL

## 2023-07-21 DIAGNOSIS — M48.062 SPINAL STENOSIS, LUMBAR REGION WITH NEUROGENIC CLAUDICATION: ICD-10-CM

## 2023-09-07 VITALS
SYSTOLIC BLOOD PRESSURE: 149 MMHG | TEMPERATURE: 97.5 F | HEIGHT: 68 IN | DIASTOLIC BLOOD PRESSURE: 79 MMHG | RESPIRATION RATE: 16 BRPM | HEART RATE: 86 BPM | WEIGHT: 239.2 LBS | BODY MASS INDEX: 36.25 KG/M2

## 2023-09-29 VITALS — BODY MASS INDEX: 36.25 KG/M2 | WEIGHT: 239.2 LBS | HEIGHT: 68 IN

## 2023-09-30 NOTE — H&P
History & Physical Reviewed:   I have reviewed the History and Physical dated:  02-May-2023   History and Physical reviewed and relevant findings noted. Patient examined to review pertinent physical  findings.: No significant changes   Home Medications Reviewed: no changes noted   Allergies Reviewed: no changes noted       ERAS (Enhanced Recovery After Surgery):  ·  ERAS Patient: no     Consent:   COVID-19 Consent:  ·  COVID-19 Risk Consent Surgeon has reviewed key risks related to the risk of rogerio COVID-19 and if they contract COVID-19 what the risks are.     Attestation:   Note Completion:  I am a:  Resident/Fellow   Attending Attestation I saw and evaluated the patient.  I personally obtained the key and critical portions of the history and physical exam or was physically present for key and  critical portions performed by the resident/fellow. I reviewed the resident/fellow?s documentation and discussed the patient with the resident/fellow.  I agree with the resident/fellow?s medical decision making as documented in the note.   I personally evaluated the patient on 19-May-2023         Electronic Signatures:  Matty Cantu (Fellow))  (Signed 19-May-2023 09:55)   Authored: History & Physical Reviewed, ERAS, Consent,  Note Completion  Dwayne Valdovinos)  (Signed 19-May-2023 10:07)   Authored: Note Completion   Co-Signer: History & Physical Reviewed, ERAS, Consent, Note Completion      Last Updated: 19-May-2023 10:07 by Dwayne Valdovinos)

## 2023-09-30 NOTE — H&P
History of Present Illness:   History Present Illness:  Reason for surgery: Lumbar stenosis with neurogenic  claudication   HPI:    Patient with lumbar stenosis with neurogenic patient here for lumbar transforaminal epidural steroid injection.  No changes since last visit.    Allergies:        Allergies:  ·  Neosporin : Rash  ·  Seafood : Anaphylaxis    Home Medication Review:   Home Medications Reviewed: yes     Impression/Procedure:   ·  Impression and Planned Procedure: Lumbar stenosis with neurogenic claudication-bilateral L5 transforaminal VILMA       ERAS (Enhanced Recovery After Surgery):  ·  ERAS Patient: no       Vital Signs:  Temperature C: 36.4 degrees C   Temperature F: 97.5 degrees F   Heart Rate: 86 beats per minute   Respiratory Rate: 16 breath per minute   Blood Pressure Systolic: 149 mm/Hg   Blood Pressure Diastolic: 79 mm/Hg     Physical Exam by System:    Constitutional: Well developed, awake/alert/oriented  x3, no distress, alert and cooperative   Eyes: PERRL, EOMI, clear sclera   ENMT: mucous membranes moist, no apparent injury,  no lesions seen   Head/Neck: Neck supple, no apparent injury, thyroid  without mass or tenderness, No JVD, trachea midline, no bruits   Respiratory/Thorax: unlabored breathing   Cardiovascular: no edema   Skin: Warm and dry, no lesions, no rashes     Consent:   COVID-19 Consent:  ·  COVID-19 Risk Consent Surgeon has reviewed key risks related to the risk of rogerio COVID-19 and if they contract COVID-19 what the risks are.       Electronic Signatures:  Dwayne Valdovinos)  (Signed 07-Jun-2023 09:55)   Authored: History of Present Illness, Allergies, Home  Medication Review, Impression/Procedure, ERAS, Physical Exam, Consent, Note Completion      Last Updated: 07-Jun-2023 09:55 by Dwayne Valdovinos)

## 2023-09-30 NOTE — H&P
History of Present Illness:   History Present Illness:  Reason for surgery: Lumbar stenosis with neurogenic  claudication   HPI:    Patient with lumbar stenosis with neurogenic claudication here for repeat L4 transforaminal on the left.  No changes since last visit.    Allergies:        Allergies:  ·  Neosporin : Rash  ·  Seafood : Anaphylaxis    Home Medication Review:   Home Medications Reviewed: yes     Impression/Procedure:   ·  Impression and Planned Procedure: Lumbar stenosis with neurogenic claudication.  Left L4 transforaminal VILMA       ERAS (Enhanced Recovery After Surgery):  ·  ERAS Patient: no       Physical Exam by System:    Constitutional: Well developed, awake/alert/oriented  x3, no distress, alert and cooperative   Eyes: PERRL, EOMI, clear sclera   ENMT: mucous membranes moist, no apparent injury,  no lesions seen   Head/Neck: Neck supple, no apparent injury, thyroid  without mass or tenderness, No JVD, trachea midline, no bruits   Respiratory/Thorax: unlabored breathing   Cardiovascular: no edema   Skin: Warm and dry, no lesions, no rashes     Consent:   COVID-19 Consent:  ·  COVID-19 Risk Consent Surgeon has reviewed key risks related to the risk of rogerio COVID-19 and if they contract COVID-19 what the risks are.       Electronic Signatures:  Dwayne Valdovinos)  (Signed 21-Jul-2023 12:49)   Authored: History of Present Illness, Allergies, Home  Medication Review, Impression/Procedure, ERAS, Physical Exam, Consent, Note Completion      Last Updated: 21-Jul-2023 12:49 by Dwayne Valdovinos)

## 2023-10-02 NOTE — OP NOTE
Post Operative Note:     PreOp Diagnosis: Left sacroiliitis   Post-Procedure Diagnosis: Left sacroiliitis   Procedure: 1.  Left SI joint injection  2.   3.   4.   5.   Surgeon: Dwayne Valdovinos MD   Resident/Fellow/Other Assistant: Matty Cantu MD   Estimated Blood Loss (mL): none   Specimen: no   Complications: none apparent   Findings: expected anatomy     Operative Report Dictated:  Dictation: not applicable - note contains Operative  Report   Operative Report:    Following informed consent, the patient was operating room and placed in the prone position. The low back area was prepped and draped with chlorhexidine in sterile  fashion.  Using fluoroscopic guidance, the skin and subcutaneous tissue overlying needle trajectory to the lower aspect of the sacroiliac joint was anesthetized with 3 cc of 0.5% Lidocaine.  A 23-gauge spinal needle was then advanced under fluoroscopic  guidance the lower aspect of the sacroiliac joint.  Injection of a small amount of contrast with appropriate intra-articular/periarticular spread.  Thereafter the below medications were injected and the needle was removed.  The patient was then transferred  to the recovery room in stable condition.    The patient is to continue with physical therapy/home exercises and update us on response/progress.    Laterality: [Left]  Medication(s):  2 mL [0.5% lidocaine] [40 mg methylprednisolone] divided between site(s)    Attestation:   Note Completion:  Attending Attestation I was present for the entire procedure         Electronic Signatures:  Dwayne Valdovinos)  (Signed 19-May-2023 10:09)   Authored: Post Operative Note, Note Completion      Last Updated: 19-May-2023 10:09 by Dwayne Valdovinos)

## 2023-10-02 NOTE — OP NOTE
Post Operative Note:     PreOp Diagnosis: Stenosis with neurogenic claudication   Post-Procedure Diagnosis: Lumbar stenosis with neurogenic  claudication   Procedure: 1.  Bilateral L5 transforaminal VILMA  2.   3.   4.   5.   Surgeon: Dwayne Valdovinos MD   Resident/Fellow/Other Assistant: Paul Graves DO   Anesthesia: Local   Estimated Blood Loss (mL): none   Specimen: no   Complications: none apparent   Findings: expected anatomy     Operative Report Dictated:  Dictation: not applicable - note contains Operative  Report   Operative Report:    The patient was identified in the preoperative area and informed consent was obtained.  Patient was brought to the procedure room and placed in the prone position.   Using fluoroscopic guidance, the skin and subcutaneous tissue overlying needle trajectories to the below neuroforamina were anesthetized with a total of 5 cc of Lidocaine.  22-gauge Quincke spinal needles were then advanced under fluoroscopic guidance  a combination of oblique, lateral and AP views to the epidural space at the inferior pedicle in the proximity of the neuroforamina.  Needle positions were confirmed in AP and lateral views.  Blood and CSF was not aspirated.  Injection of iohexol contrast  under live fluoroscopy revealed appropriate epidural spread without evidence of vascular uptake.  Thereafter the below medication was  injected into each needle tip and the needles removed.  Patient was then transferred to the recovery room in stable  condition and will update us on outpatient basis on the response to the procedure.    Level(s): L5    Laterality [Bilateral]  Medication(s): [10mg Dexamethasone] [3 mL 0.5% lidocaine] divided between site(s)      Electronic Signatures:  Dwayne Valdovinos)  (Signed 07-Jun-2023 10:24)   Authored: Post Operative Note, Note Completion      Last Updated: 07-Jun-2023 10:24 by Dwayne Valdovinos)

## 2023-10-02 NOTE — OP NOTE
Post Operative Note:     PreOp Diagnosis: Lumbar stenosis with neurogenic  claudication   Post-Procedure Diagnosis: Lumbar stenosis with neurogenic  claudication   Procedure: 1.  Left L4 transforaminal VILMA  2.   3.   4.   5.   Surgeon: Dwayne Valdovinos MD   Resident/Fellow/Other Assistant: none   Estimated Blood Loss (mL): none   Specimen: no   Complications: none apparent   Findings: expected anatomy     Operative Report Dictated:  Dictation: not applicable - note contains Operative  Report   Operative Report:    The patient was identified in the preoperative area and informed consent was obtained.  Patient was brought to the procedure room and placed in the prone position.   Using fluoroscopic guidance, the skin and subcutaneous tissue overlying needle trajectories to the below neuroforamina were anesthetized with a total of 5 cc of Lidocaine.  22-gauge Quincke spinal needles were then advanced under fluoroscopic guidance  a combination of oblique, lateral and AP views to the epidural space at the inferior pedicle in the proximity of the neuroforamina.  Needle positions were confirmed in AP and lateral views.  Blood and CSF was not aspirated.  Injection of iohexol contrast  under live fluoroscopy revealed appropriate epidural spread without evidence of vascular uptake.  Thereafter the below medication was  injected into each needle tip and the needles removed.  Patient was then transferred to the recovery room in stable  condition and will update us on outpatient basis on the response to the procedure.    Level(s): [L4]  Laterality left  Medication(s): [10mg Dexamethasone] [2 mL 0.5% lidocaine] divided between site(s)      Electronic Signatures:  Dwayne Valdovinos)  (Signed 21-Jul-2023 13:18)   Authored: Post Operative Note, Note Completion      Last Updated: 21-Jul-2023 13:18 by Dwayne Valdovinos)

## 2023-11-07 ENCOUNTER — OFFICE VISIT (OUTPATIENT)
Dept: PAIN MEDICINE | Facility: CLINIC | Age: 63
End: 2023-11-07
Payer: COMMERCIAL

## 2023-11-07 VITALS
BODY MASS INDEX: 35.16 KG/M2 | DIASTOLIC BLOOD PRESSURE: 83 MMHG | RESPIRATION RATE: 16 BRPM | WEIGHT: 232 LBS | SYSTOLIC BLOOD PRESSURE: 135 MMHG | HEART RATE: 92 BPM | HEIGHT: 68 IN

## 2023-11-07 DIAGNOSIS — M48.062 SPINAL STENOSIS OF LUMBAR REGION WITH NEUROGENIC CLAUDICATION: Primary | ICD-10-CM

## 2023-11-07 DIAGNOSIS — M47.816 LUMBAR SPONDYLOSIS: ICD-10-CM

## 2023-11-07 PROCEDURE — 3079F DIAST BP 80-89 MM HG: CPT | Performed by: PHYSICAL MEDICINE & REHABILITATION

## 2023-11-07 PROCEDURE — 1036F TOBACCO NON-USER: CPT | Performed by: PHYSICAL MEDICINE & REHABILITATION

## 2023-11-07 PROCEDURE — 3075F SYST BP GE 130 - 139MM HG: CPT | Performed by: PHYSICAL MEDICINE & REHABILITATION

## 2023-11-07 PROCEDURE — 99214 OFFICE O/P EST MOD 30 MIN: CPT | Performed by: PHYSICAL MEDICINE & REHABILITATION

## 2023-11-07 RX ORDER — NORTRIPTYLINE HYDROCHLORIDE 10 MG/1
CAPSULE ORAL
Qty: 60 CAPSULE | Refills: 2 | Status: SHIPPED | OUTPATIENT
Start: 2023-11-07 | End: 2023-12-05 | Stop reason: ALTCHOICE

## 2023-11-07 ASSESSMENT — PAIN SCALES - GENERAL: PAINLEVEL: 6

## 2023-11-07 NOTE — ASSESSMENT & PLAN NOTE
62-year-old male with lower back pain with lower extremity claudication symptoms.  I did personally reviewed patient's MRI of his lumbar spine from a year ago showing 6 mm thickening of his ligamentum flavum at L4-5 causing moderate central canal stenosis at that level.  I am a little worried that his injections are starting to have waning effects however I think it would be reasonable to repeat it since he has done well overall with these.  I also did give patient some information about the MI LD procedure as a more long-term definitive treatment.  Our plan for today:  - We will schedule patient for bilateral L4 transforaminal epidural steroid injection.  Discussed risk benefits alternatives and patient would like proceed.  - We will start low-dose nortriptyline 10 to 20 mg at night for neuropathic pain.

## 2023-11-07 NOTE — PROGRESS NOTES
Subjective   Patient ID: Antonino Banks is a 62 y.o. male who presents for Back Pain.  HPI    62-year-old male with history of lower back pain with lower extremity claudication symptoms presenting today for follow-up.  Patient has undergone numerous bilateral L4 transforaminal epidural steroid injections with good relief of his claudication symptoms.  His last 1 was done in June of this year.  He did not do quite as much relief with this 1 ever overall he has done very well with these injections.  He is not taking anything for neuropathic pain and he would like another injection as well as potentially adding something for neuropathic pain.  Otherwise symptoms are unchanged from previous evaluation before his injections                  Monitoring and compliance:    ORT:    PDUQ:    Office Agreement:      Review of Systems   All other systems reviewed and are negative.       Current Outpatient Medications   Medication Instructions    albuterol (Proventil HFA) 90 mcg/actuation inhaler 2 puffs, inhalation, Every 4 hours PRN    atenolol (TENORMIN) 12.5 mg, oral, Every other day    cyclobenzaprine (Flexeril) 5 mg tablet TAKE 1 TO 2 TABLETS BY MOUTH TWICE DAILY AS NEEDED    diazePAM (VALIUM) 2 mg, oral, Daily PRN, Expires in  90 d. Form  original  renewal.    divalproex (Depakote) 125 mg EC tablet 1 tablet, oral, Every 12 hours, With food    ergocalciferol (Vitamin D-2) 1.25 MG (37143 UT) capsule 1 capsule, oral, Weekly    finasteride (Proscar) 5 mg tablet 1 tablet, oral, Daily    gabapentin (Neurontin) 300 mg capsule Follow titration schedule    hydrOXYzine HCL (ATARAX) 25 mg, oral, Nightly    nabumetone (RELAFEN) 1,000 mg, oral, As needed    nortriptyline (Pamelor) 10 mg capsule Take 10-20 mg at bedtime for pain    sertraline (ZOLOFT) 100 mg, oral, Daily, please follow up for further refills    tadalafil (CIALIS) 10 mg, oral, As needed, 1 HOUR BEFORE ACTIVITY        Past Medical History:   Diagnosis Date     Abdominal distension (gaseous) 06/07/2022    Abdominal bloating    Abrasion, right knee, initial encounter 06/06/2018    Abrasion of right knee, initial encounter    Actinic keratosis 12/06/2017    Actinic keratoses    Allergic contact dermatitis due to plants, except food 06/06/2018    Poison ivy    Anesthesia of skin 02/27/2019    Leg numbness    Bitten or stung by nonvenomous insect and other nonvenomous arthropods, initial encounter 04/13/2021    Bug bite    Body mass index (BMI) 35.0-35.9, adult     BMI 35.0-35.9,adult    Body mass index (BMI) 35.0-35.9, adult 08/27/2021    BMI 35.0-35.9,adult    Body mass index (BMI) 35.0-35.9, adult 01/18/2022    BMI 35.0-35.9,adult    Body mass index (BMI) 36.0-36.9, adult 10/07/2022    BMI 36.0-36.9,adult    Chronic post-traumatic headache, not intractable 01/24/2019    Chronic post-traumatic headache    Drug-induced erectile dysfunction 01/24/2019    Drug-induced erectile dysfunction    Encounter for screening for malignant neoplasm of colon 01/24/2019    Colon cancer screening    Encounter for screening for malignant neoplasm of prostate 04/05/2017    Screening PSA (prostate specific antigen)    Encounter for screening for malignant neoplasm of rectum 01/24/2019    Screening for rectal cancer    Hemorrhage, not elsewhere classified 02/18/2021    Blood on toilet paper    Laceration without foreign body of left thumb without damage to nail, initial encounter 10/29/2018    Thumb laceration, left, initial encounter    Local infection of the skin and subcutaneous tissue, unspecified 08/27/2021    Skin infection    New daily persistent headache (ndph) 08/15/2018    New daily persistent headache    Non-pressure chronic ulcer of back with unspecified severity (CMS/HCC) 05/17/2021    Sacral ulcer    Non-pressure chronic ulcer of left ankle limited to breakdown of skin (CMS/HCC) 09/21/2022    Skin ulcer of left ankle, limited to breakdown of skin    Other chest pain 05/09/2018     Other chest pain    Other conditions influencing health status 08/09/2013    Closed Fracture Of The Left Ninth Rib    Other conditions influencing health status 08/09/2013    Closed Fracture Of The Left Seventh Rib    Other microscopic hematuria 08/15/2018    Microhematuria    Other reactions to severe stress 10/26/2017    Stress reaction, chronic    Other specified respiratory disorders 03/02/2022    Respiratory infection    Personal history of diseases of the skin and subcutaneous tissue 12/18/2017    History of impetigo    Personal history of diseases of the skin and subcutaneous tissue 06/24/2018    History of dermatitis    Personal history of neoplasm of uncertain behavior 01/17/2020    History of neoplasm of uncertain behavior of skin    Personal history of other diseases of the circulatory system 01/18/2022    History of hypotension    Personal history of other diseases of the musculoskeletal system and connective tissue 08/02/2019    History of chronic back pain    Personal history of other diseases of the musculoskeletal system and connective tissue 05/23/2017    History of neck pain    Personal history of other diseases of the respiratory system 11/30/2020    History of sore throat    Personal history of other diseases of the respiratory system 03/04/2022    History of sinusitis    Personal history of other endocrine, nutritional and metabolic disease 05/31/2018    History of hypoglycemia    Personal history of other endocrine, nutritional and metabolic disease 05/31/2018    History of hypoglycemia    Personal history of other mental and behavioral disorders 10/29/2018    History of nightmares    Personal history of other specified conditions 12/18/2017    History of vertigo    Personal history of other specified conditions 04/24/2019    History of chronic fatigue    Personal history of other specified conditions 09/27/2019    History of dizziness    Personal history of other specified conditions  09/27/2019    History of balance disorder    Personal history of other specified conditions 07/17/2018    History of shortness of breath    Personal history of other specified conditions 08/01/2022    History of urinary retention    Postinflammatory hyperpigmentation 08/15/2018    Post-inflammatory hyperpigmentation    Reaction to severe stress, unspecified 04/06/2016    Stress    Sleep terrors (night terrors) 10/29/2018    Adult night terror    Tinnitus, bilateral 09/27/2019    Tinnitus, bilateral    Tinnitus, unspecified ear 10/29/2018    Subjective tinnitus    Unspecified acute conjunctivitis, bilateral 04/09/2019    Acute bacterial conjunctivitis of both eyes    Unspecified acute conjunctivitis, bilateral 01/04/2017    Acute bacterial conjunctivitis of both eyes    Unspecified fall, initial encounter 02/27/2019    Accidental fall, initial encounter    Unspecified fall, initial encounter 04/11/2018    Fall in home, initial encounter    Unspecified injury of nose, initial encounter 02/15/2019    Nose injury, initial encounter    Unspecified injury of right lower leg, initial encounter 02/27/2019    Injury of right knee, initial encounter    Unspecified injury of right lower leg, initial encounter 06/06/2018    Injury of right knee, initial encounter    Unspecified injury of right wrist, hand and finger(s), initial encounter 04/29/2016    Injury of right hand, initial encounter    Unspecified open wound of scalp, initial encounter 04/06/2016    Open wound of scalp        History reviewed. No pertinent surgical history.     Family History   Problem Relation Name Age of Onset    Heart failure Father      Heart attack Father          Allergies   Allergen Reactions    Mold Hives    Neosporin Af [Miconazole Nitrate] Unknown    Other Unknown     Seafood     Shellfish Containing Products Hives        Objective     Vitals:    11/07/23 0831   BP: 135/83   Pulse: 92   Resp: 16        Physical Exam    GENERAL EXAM  Vital  Signs: Vital signs to include heart rate, respiration rate, blood pressure, and temperature were reviewed.  General Appearance:  Awake, alert, healthy appearing, well developed, No acute distress.  Head: Normocephalic without evidence of head injury.  Neck: The appearance of the neck was normal without swelling with a midline trachea.  Eyes: The eyelids and eyebrows exhibited no abnormalities.  Pupils were not pin-point.  Sclera was without icterus.  Lungs: Respiration rhythm and depth was normal.  Respiratory movements were normal without labored breathing.  Cardiovascular: No peripheral edema was present.    Neurological: Patient was oriented to time, place, and person.  Speech was normal.  Balance, gait, and stance were unremarkable.    Psychiatric: Appearance was normal with appropriate dress.  Mood was euthymic and affect was normal.  Skin: Affected regions were without ecchymosis or skin lesions.        Physical exam as above except:        Assessment/Plan   Problem List Items Addressed This Visit             ICD-10-CM    Lumbar spondylosis M47.816    Spinal stenosis of lumbar region with neurogenic claudication - Primary M48.062     62-year-old male with lower back pain with lower extremity claudication symptoms.  I did personally reviewed patient's MRI of his lumbar spine from a year ago showing 6 mm thickening of his ligamentum flavum at L4-5 causing moderate central canal stenosis at that level.  I am a little worried that his injections are starting to have waning effects however I think it would be reasonable to repeat it since he has done well overall with these.  I also did give patient some information about the MI LD procedure as a more long-term definitive treatment.  Our plan for today:  - We will schedule patient for bilateral L4 transforaminal epidural steroid injection.  Discussed risk benefits alternatives and patient would like proceed.  - We will start low-dose nortriptyline 10 to 20 mg at  night for neuropathic pain.         Relevant Medications    nortriptyline (Pamelor) 10 mg capsule    Other Relevant Orders    Transforaminal

## 2023-12-05 ENCOUNTER — OFFICE VISIT (OUTPATIENT)
Dept: PRIMARY CARE | Facility: CLINIC | Age: 63
End: 2023-12-05
Payer: COMMERCIAL

## 2023-12-05 VITALS
DIASTOLIC BLOOD PRESSURE: 80 MMHG | HEART RATE: 82 BPM | WEIGHT: 236 LBS | HEIGHT: 68 IN | OXYGEN SATURATION: 95 % | BODY MASS INDEX: 35.77 KG/M2 | SYSTOLIC BLOOD PRESSURE: 134 MMHG

## 2023-12-05 DIAGNOSIS — S39.012A STRAIN OF MUSCLE, FASCIA AND TENDON OF LOWER BACK, INITIAL ENCOUNTER: Primary | ICD-10-CM

## 2023-12-05 DIAGNOSIS — M54.9 TRIGGER POINT WITH BACK PAIN: ICD-10-CM

## 2023-12-05 PROCEDURE — 3079F DIAST BP 80-89 MM HG: CPT

## 2023-12-05 PROCEDURE — 1036F TOBACCO NON-USER: CPT

## 2023-12-05 PROCEDURE — 99213 OFFICE O/P EST LOW 20 MIN: CPT

## 2023-12-05 PROCEDURE — 3075F SYST BP GE 130 - 139MM HG: CPT

## 2023-12-05 RX ORDER — CYCLOBENZAPRINE HCL 10 MG
TABLET ORAL
Qty: 14 TABLET | Refills: 0 | Status: SHIPPED | OUTPATIENT
Start: 2023-12-05

## 2023-12-05 ASSESSMENT — ENCOUNTER SYMPTOMS
MYALGIAS: 1
LOSS OF SENSATION IN FEET: 0
WEAKNESS: 0
NUMBNESS: 0
DEPRESSION: 0
BACK PAIN: 1
OCCASIONAL FEELINGS OF UNSTEADINESS: 0

## 2023-12-05 ASSESSMENT — PAIN SCALES - GENERAL: PAINLEVEL: 2

## 2023-12-05 NOTE — PROGRESS NOTES
"Subjective   Patient ID: Antonino Banks is a 62 y.o. male who presents for Back Pain (From a fall a hour ago /la).    Dr. England's Patient.    Back pain x 1 days. Patient went to sit on toilet this morning, was bending over, and felt \"electric shock\" down right side and fall forward. Patient states has muscle spasms in the past but never this extreme. Patient typically has pain in middle lower back, but spasms pain was on right lower back and right gluteus. Still hurting on right side, since fall. Pain rated at rest \"3.\" Exacerbating with standing, compression of spine. Trunk rotation or flexion. Alleviating with laying down. Patient sees pain management, is suppose to have spine injections for arthritis this month. Does not like taking steroid. Denies paresthesias, weakness.          Review of Systems   Musculoskeletal:  Positive for back pain, gait problem and myalgias.   Neurological:  Negative for weakness and numbness.       Objective   /80   Pulse 82   Ht 1.727 m (5' 8\")   Wt 107 kg (236 lb)   SpO2 95%   BMI 35.88 kg/m²     Physical Exam  Constitutional:       Appearance: Normal appearance.   Pulmonary:      Effort: Pulmonary effort is normal.   Musculoskeletal:      Lumbar back: Spasms, tenderness and bony tenderness present. Decreased range of motion. Negative right straight leg raise test and negative left straight leg raise test.        Back:       Comments: TTP where marked, very poor hamstring flexibility   Skin:     Findings: No rash.   Neurological:      Mental Status: He is alert.   Psychiatric:         Mood and Affect: Mood and affect normal.         Assessment/Plan   Diagnoses and all orders for this visit:  Strain of muscle, fascia and tendon of lower back, initial encounter  -     cyclobenzaprine (Flexeril) 10 mg tablet; Take 1/2 - 1 tablet at night prn for back spasms  -     Referral to Physical Therapy; Future  Trigger point with back pain  -     cyclobenzaprine (Flexeril) 10 mg " tablet; Take 1/2 - 1 tablet at night prn for back spasms  -     Referral to Physical Therapy; Future

## 2023-12-11 ENCOUNTER — TELEPHONE (OUTPATIENT)
Dept: PAIN MEDICINE | Facility: CLINIC | Age: 63
End: 2023-12-11
Payer: COMMERCIAL

## 2023-12-11 NOTE — TELEPHONE ENCOUNTER
Spoke with the patient on the phone regarding his lumbar Transforaminal injections. He states adamantly that he gets 60%-70% or greater relief of his lumbar pain for anywhere from 3-5 months after he gets lumbar transforaminal injections. The amount of relief and duration of relief varies depending on his activity level. He sometimes has to rake leaves or shovel show which greatly aggravates his low back pain. Prior to getting an injection he says his pain level is at an 8/10. After the injections he stays around a 1/10 or 2/10 most of the time. He says these injections greatly improve his quality of life. He has recently hired a service to help with leaf and snow removal since this was causing him extra pain. He is scheduled for a repeat injection 12/20/23. His last injection was 7/21/23 so it has been 5 months that the injection has been helping him. His pain is stating to increase and he would like another injection before the holidays to help him with the increase in daily activities.

## 2023-12-19 ENCOUNTER — EVALUATION (OUTPATIENT)
Dept: PHYSICAL THERAPY | Facility: CLINIC | Age: 63
End: 2023-12-19
Payer: COMMERCIAL

## 2023-12-19 DIAGNOSIS — S39.012A STRAIN OF MUSCLE, FASCIA AND TENDON OF LOWER BACK, INITIAL ENCOUNTER: ICD-10-CM

## 2023-12-19 DIAGNOSIS — M54.9 TRIGGER POINT WITH BACK PAIN: ICD-10-CM

## 2023-12-19 PROCEDURE — 97162 PT EVAL MOD COMPLEX 30 MIN: CPT | Mod: GP | Performed by: PHYSICAL THERAPIST

## 2023-12-19 ASSESSMENT — ENCOUNTER SYMPTOMS
DEPRESSION: 0
LOSS OF SENSATION IN FEET: 0
OCCASIONAL FEELINGS OF UNSTEADINESS: 1
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
## 2023-12-20 ENCOUNTER — ANCILLARY PROCEDURE (OUTPATIENT)
Dept: RADIOLOGY | Facility: CLINIC | Age: 63
End: 2023-12-20
Payer: COMMERCIAL

## 2023-12-20 ENCOUNTER — HOSPITAL ENCOUNTER (OUTPATIENT)
Dept: OPERATING ROOM | Facility: CLINIC | Age: 63
Setting detail: OUTPATIENT SURGERY
Discharge: HOME | End: 2023-12-20
Payer: COMMERCIAL

## 2023-12-20 VITALS
DIASTOLIC BLOOD PRESSURE: 79 MMHG | RESPIRATION RATE: 16 BRPM | WEIGHT: 234.13 LBS | SYSTOLIC BLOOD PRESSURE: 128 MMHG | HEIGHT: 68 IN | HEART RATE: 79 BPM | TEMPERATURE: 97.2 F | OXYGEN SATURATION: 97 % | BODY MASS INDEX: 35.48 KG/M2

## 2023-12-20 DIAGNOSIS — M48.062 SPINAL STENOSIS OF LUMBAR REGION WITH NEUROGENIC CLAUDICATION: ICD-10-CM

## 2023-12-20 PROCEDURE — 64483 NJX AA&/STRD TFRM EPI L/S 1: CPT | Performed by: PHYSICAL MEDICINE & REHABILITATION

## 2023-12-20 PROCEDURE — 2500000004 HC RX 250 GENERAL PHARMACY W/ HCPCS (ALT 636 FOR OP/ED): Mod: SE | Performed by: PHYSICAL MEDICINE & REHABILITATION

## 2023-12-20 PROCEDURE — 2550000001 HC RX 255 CONTRASTS: Mod: SE | Performed by: PHYSICAL MEDICINE & REHABILITATION

## 2023-12-20 PROCEDURE — 64483 NJX AA&/STRD TFRM EPI L/S 1: CPT | Mod: 50

## 2023-12-20 PROCEDURE — 77003 FLUOROGUIDE FOR SPINE INJECT: CPT | Mod: RSC

## 2023-12-20 PROCEDURE — 2500000005 HC RX 250 GENERAL PHARMACY W/O HCPCS: Mod: SE | Performed by: PHYSICAL MEDICINE & REHABILITATION

## 2023-12-20 RX ORDER — DEXAMETHASONE SODIUM PHOSPHATE 100 MG/10ML
INJECTION INTRAMUSCULAR; INTRAVENOUS AS NEEDED
Status: COMPLETED | OUTPATIENT
Start: 2023-12-20 | End: 2023-12-20

## 2023-12-20 RX ORDER — LIDOCAINE HYDROCHLORIDE 5 MG/ML
INJECTION, SOLUTION INFILTRATION; PERINEURAL AS NEEDED
Status: COMPLETED | OUTPATIENT
Start: 2023-12-20 | End: 2023-12-20

## 2023-12-20 RX ADMIN — DEXAMETHASONE SODIUM PHOSPHATE 10 MG: 10 INJECTION INTRAMUSCULAR; INTRAVENOUS at 11:50

## 2023-12-20 RX ADMIN — LIDOCAINE HYDROCHLORIDE 5 ML: 5 INJECTION, SOLUTION INFILTRATION; PERINEURAL at 11:50

## 2023-12-20 RX ADMIN — IOHEXOL 240 MG: 240 INJECTION, SOLUTION INTRATHECAL; INTRAVASCULAR; INTRAVENOUS; ORAL at 11:50

## 2023-12-20 ASSESSMENT — COLUMBIA-SUICIDE SEVERITY RATING SCALE - C-SSRS
1. IN THE PAST MONTH, HAVE YOU WISHED YOU WERE DEAD OR WISHED YOU COULD GO TO SLEEP AND NOT WAKE UP?: NO
2. HAVE YOU ACTUALLY HAD ANY THOUGHTS OF KILLING YOURSELF?: NO

## 2023-12-20 ASSESSMENT — PAIN SCALES - GENERAL
PAINLEVEL_OUTOF10: 1
PAINLEVEL_OUTOF10: 6

## 2023-12-20 ASSESSMENT — PAIN - FUNCTIONAL ASSESSMENT: PAIN_FUNCTIONAL_ASSESSMENT: 0-10

## 2023-12-20 NOTE — H&P
History Of Present Illness  Antonino Banks is a 62 y.o. male presents for procedure state below. Endorses no changes in past medical history or medical health since last seen in clinic.     Past Medical History  He has a past medical history of Abdominal distension (gaseous) (06/07/2022), Abrasion, right knee, initial encounter (06/06/2018), Actinic keratosis (12/06/2017), Allergic contact dermatitis due to plants, except food (06/06/2018), Anesthesia of skin (02/27/2019), Bitten or stung by nonvenomous insect and other nonvenomous arthropods, initial encounter (04/13/2021), Body mass index (BMI) 35.0-35.9, adult, Body mass index (BMI) 35.0-35.9, adult (08/27/2021), Body mass index (BMI) 35.0-35.9, adult (01/18/2022), Body mass index (BMI) 36.0-36.9, adult (10/07/2022), Chronic post-traumatic headache, not intractable (01/24/2019), Drug-induced erectile dysfunction (01/24/2019), Encounter for screening for malignant neoplasm of colon (01/24/2019), Encounter for screening for malignant neoplasm of prostate (04/05/2017), Encounter for screening for malignant neoplasm of rectum (01/24/2019), Hemorrhage, not elsewhere classified (02/18/2021), Laceration without foreign body of left thumb without damage to nail, initial encounter (10/29/2018), Local infection of the skin and subcutaneous tissue, unspecified (08/27/2021), New daily persistent headache (ndph) (08/15/2018), Non-pressure chronic ulcer of back with unspecified severity (CMS/HCC) (05/17/2021), Non-pressure chronic ulcer of left ankle limited to breakdown of skin (CMS/HCC) (09/21/2022), Other chest pain (05/09/2018), Other conditions influencing health status (08/09/2013), Other conditions influencing health status (08/09/2013), Other microscopic hematuria (08/15/2018), Other reactions to severe stress (10/26/2017), Other specified respiratory disorders (03/02/2022), Personal history of diseases of the skin and subcutaneous tissue (12/18/2017), Personal  history of diseases of the skin and subcutaneous tissue (06/24/2018), Personal history of neoplasm of uncertain behavior (01/17/2020), Personal history of other diseases of the circulatory system (01/18/2022), Personal history of other diseases of the musculoskeletal system and connective tissue (08/02/2019), Personal history of other diseases of the musculoskeletal system and connective tissue (05/23/2017), Personal history of other diseases of the respiratory system (11/30/2020), Personal history of other diseases of the respiratory system (03/04/2022), Personal history of other endocrine, nutritional and metabolic disease (05/31/2018), Personal history of other endocrine, nutritional and metabolic disease (05/31/2018), Personal history of other mental and behavioral disorders (10/29/2018), Personal history of other specified conditions (12/18/2017), Personal history of other specified conditions (04/24/2019), Personal history of other specified conditions (09/27/2019), Personal history of other specified conditions (09/27/2019), Personal history of other specified conditions (07/17/2018), Personal history of other specified conditions (08/01/2022), Postinflammatory hyperpigmentation (08/15/2018), Reaction to severe stress, unspecified (04/06/2016), Sleep terrors (night terrors) (10/29/2018), Tinnitus, bilateral (09/27/2019), Tinnitus, unspecified ear (10/29/2018), Unspecified acute conjunctivitis, bilateral (04/09/2019), Unspecified acute conjunctivitis, bilateral (01/04/2017), Unspecified fall, initial encounter (02/27/2019), Unspecified fall, initial encounter (04/11/2018), Unspecified injury of nose, initial encounter (02/15/2019), Unspecified injury of right lower leg, initial encounter (02/27/2019), Unspecified injury of right lower leg, initial encounter (06/06/2018), Unspecified injury of right wrist, hand and finger(s), initial encounter (04/29/2016), and Unspecified open wound of scalp, initial encounter  (04/06/2016).    Surgical History  He has no past surgical history on file.     Social History  He reports that he has never smoked. He has never used smokeless tobacco. He reports that he does not currently use alcohol. He reports that he does not use drugs.    Family History  Family History   Problem Relation Name Age of Onset    Heart failure Father      Heart attack Father          Allergies  Mold, Neosporin af [miconazole nitrate], Other, and Shellfish containing products    Review of Symptoms:   Constitutional: Negative for chills, diaphoresis or fever  HENT: Negative for neck swelling  Eyes:.  Negative for eye pain  Respiratory:.  Negative for cough, shortness of breath or wheezing    Cardiovascular:.  Negative for chest pain or palpitations  Gastrointestinal:.  Negative for abdominal pain, nausea and vomiting  Genitourinary:.  Negative for urgency  Musculoskeletal:  Positive for back and leg pain. Positive for joint pain. Denies falls within the past 3 months.  Skin: Negative for wounds or itching   Neurological: Negative for dizziness, seizures, loss of consciousness and weakness  Endo/Heme/Allergies: Does not bruise/bleed easily  Psychiatric/Behavioral: Negative for depression. The patient does not appear anxious.       PHYSICAL EXAM  Vitals signs reviewed  Constitutional:       General: Not in acute distress     Appearance: Normal appearance. Not ill-appearing.  HENT:     Head: Normocephalic and atraumatic  Eyes:     Conjunctiva/sclera: Conjunctivae normal  Cardiovascular:     Rate and Rhythm: Normal rate and regular rhythm  Pulmonary:     Effort: No respiratory distress  Abdominal:     Palpations: Abdomen is soft  Musculoskeletal: MYERS  Skin:     General: Skin is warm and dry  Neurological:     General: No focal deficit present  Psychiatric:         Mood and Affect: Mood normal         Behavior: Behavior normal     Last Recorded Vitals  /83   Pulse 88   Temp 36.8 °C (98.2 °F) (Temporal)   Wt 106  kg (234 lb 2.1 oz)     Relevant Results  Current Outpatient Medications   Medication Instructions    atenolol (TENORMIN) 12.5 mg, oral, Every other day    cyclobenzaprine (Flexeril) 10 mg tablet Take 1/2 - 1 tablet at night prn for back spasms    tadalafil (CIALIS) 10 mg, oral, As needed, 1 HOUR BEFORE ACTIVITY         MR lumbar spine wo IV contrast     Narrative  PROCEDURE:         SPINE LUMBAR WO CONTRAST - [facility]  2010  REASON FOR EXAM: RADICULOPATHY, LUMBAR REGION    RESULT: MRN: [id number]  Patient Name: MELLISA HUMPHREY    STUDY:  SPINE LUMBAR WO CONTRAST; 10/13/2022 11:23 am    INDICATION:  RADICULOPATHY, LUMBAR REGION. Low back pain extending into the legs. Chronic  pain.    COMPARISON:  04/14/2004    ACCESSION NUMBER(S):  [id number]    ORDERING CLINICIAN:  RAI MENDOZA    TECHNIQUE:  Multiplanar T1, T2 and STIR images were obtained.    FINDINGS:  The conus medullaris is normal in appearance. The marrow space signal  intensity is without evidence for acute fracture, marrow space infection or  marrow space neoplasm.  There is disc space narrowing particularly anteriorly at L3-4 with a  superior endplate Schmorl's node of L4. This appearance is unchanged from  2004. There is a wedge deformity of T12 with superior inferior endplate  deformities, unchanged from 2004.    The thecal sac and intervertebral foramina are capacious from midportion of  T11 through the midportion of L1.    L1-L2: There is no interval change when compared to the previous examination.  There is a mild central disc protrusion at L1-2 minimally effacing the  thecal sac. The intervertebral foramina are widely patent.    L2-L3: There is no interval change when compared to the previous examination.  Thecal sac and intervertebral foramina are capacious at this level.    L3-L4: There is no interval change when compared to the previous examination.  The thecal sac is capacious at this level. The intervertebral foramina are  widely patent.    L4-L5:  The spinal canal and foraminal encroachment have increased compared  to the previous study at this level.  No evidence for disc herniation is identified at this level. There is  posterior encroachment upon the thecal sac at this level from facet  arthropathy and ligamentum flavum thickening with ligamentum flavum  thickening measuring up to 5.6 mm. There is moderate bilateral foraminal  stenosis from facet arthropathy and osteophyte disc encroachment.    L5-S1: Findings at this level are similar compared to the prior study.  There is a broad-based effacement of the thecal sac at L5-S1 secondary to 3  mm disc herniation. Effacement is minimal. There is severe foraminal  stenosis bilaterally at this level, more marked on the left secondary to  facet arthropathy and osteophyte disc encroachment.    Impression  1. Lumbar spondylosis as detailed above. Please see the individual disc  level report above.  2. There is increasing canal and foraminal encroachment at the L4-5 level as  described. Remainder the exam is similar compared to the previous study.    Dictation workstation:   [id number]    Original Interpreting Physician:   DENA JAUREGUI M.D.  Original Transcribed by/Date: MMODAL Oct 13 2022 10:02A  Original Electronically Signed by/Date: DENA JAUREGUI M.D. Oct 13 2022  1:59P    Addendum Interpreting Physician:  Addendum Transcribed by/Date: NO ADDENDUM  Addendum Electronically Signed by/Date:     No image results found.       1. Spinal stenosis of lumbar region with neurogenic claudication  Transforaminal    Transforaminal           ASSESSMENT/PLAN  Antonino Banks is a 62 y.o. male presents for bilateral L4 transforaminal epidural steroid injection    Our plan is as follows:  - Follow In pain clinic  - Continue to participate in physical therapy as well as physician directed home exercises  - Continue pain medications as prescribed       Rocco Still MD

## 2023-12-20 NOTE — PROCEDURES
General    Date/Time: 12/20/2023 11:48 AM    Performed by: Dwayne Valdovinos MD  Authorized by: Dwayne Valdovinos MD    Consent:     Consent obtained:  Written    Consent given by:  Patient    Risks, benefits, and alternatives were discussed: yes      Risks discussed:  Bleeding, infection, pain and nerve damage    Alternatives discussed:  No treatment, delayed treatment and alternative treatment  Universal protocol:     Procedure explained and questions answered to patient or proxy's satisfaction: yes      Relevant documents present and verified: yes      Test results available: yes      Imaging studies available: yes      Required blood products, implants, devices, and special equipment available: yes      Site/side marked: yes      Immediately prior to procedure, a time out was called: yes      Patient identity confirmed:  Verbally with patient, hospital-assigned identification number and arm band  Procedure specific details:      Lumbar transforaminal VILMA    The patient was identified in the preoperative area and informed consent was obtained.  Patient was brought to the procedure room and placed in the prone position.  Using fluoroscopic guidance, the skin and subcutaneous tissue overlying needle trajectories to the below neuroforamina were anesthetized with a total of 5 cc of Lidocaine.  22-gauge Quincke spinal needles were then advanced under fluoroscopic guidance a combination of oblique, lateral and AP views to the epidural space at the inferior pedicle in the proximity of the neuroforamina.  Needle positions were confirmed in AP and lateral views.  Blood and CSF was not aspirated.  Injection of iohexol contrast under live fluoroscopy revealed appropriate epidural spread without evidence of vascular uptake.  Thereafter the below medication was  injected into each needle tip and the needles removed.  Patient was then transferred to the recovery room in stable condition and will update us on outpatient basis on  the response to the procedure.    Level(s): [L4]  Laterality [Bilateral]  Medication(s): [10mg Dexamethasone] [3 mL 0.5% lidocaine] divided between site(s)

## 2024-01-04 ENCOUNTER — APPOINTMENT (OUTPATIENT)
Dept: PHYSICAL THERAPY | Facility: CLINIC | Age: 64
End: 2024-01-04
Payer: COMMERCIAL

## 2024-01-12 ENCOUNTER — APPOINTMENT (OUTPATIENT)
Dept: PHYSICAL THERAPY | Facility: CLINIC | Age: 64
End: 2024-01-12
Payer: COMMERCIAL

## 2024-02-23 ENCOUNTER — DOCUMENTATION (OUTPATIENT)
Dept: PHYSICAL THERAPY | Facility: CLINIC | Age: 64
End: 2024-02-23
Payer: COMMERCIAL

## 2024-02-23 NOTE — PROGRESS NOTES
Physical Therapy    Discharge Summary    Name: Antonino Banks  MRN: [id number]  : 1960  Date: 24    Discharge Summary: PT    Discharge Information: Date of discharge 24    Therapy Summary: pt came in for PT Eval, then got an injection, did not return to PT after that.    Discharge Status:      Rehab Discharge Reason: Failed to schedule and/or keep follow-up appointment(s)

## 2024-02-27 ENCOUNTER — APPOINTMENT (OUTPATIENT)
Dept: PRIMARY CARE | Facility: CLINIC | Age: 64
End: 2024-02-27
Payer: COMMERCIAL

## 2024-05-09 ENCOUNTER — APPOINTMENT (OUTPATIENT)
Dept: PRIMARY CARE | Facility: CLINIC | Age: 64
End: 2024-05-09
Payer: COMMERCIAL

## 2024-12-03 ENCOUNTER — APPOINTMENT (OUTPATIENT)
Dept: PRIMARY CARE | Facility: CLINIC | Age: 64
End: 2024-12-03
Payer: MEDICARE

## 2024-12-03 DIAGNOSIS — J06.9 UPPER RESPIRATORY TRACT INFECTION, UNSPECIFIED TYPE: Primary | ICD-10-CM

## 2024-12-03 PROCEDURE — 99442 PR PHYS/QHP TELEPHONE EVALUATION 11-20 MIN: CPT | Performed by: INTERNAL MEDICINE

## 2024-12-03 RX ORDER — BENZONATATE 100 MG/1
100 CAPSULE ORAL 3 TIMES DAILY PRN
Qty: 30 CAPSULE | Refills: 0 | Status: SHIPPED | OUTPATIENT
Start: 2024-12-03 | End: 2024-12-13

## 2024-12-03 RX ORDER — AZITHROMYCIN 250 MG/1
TABLET, FILM COATED ORAL
Qty: 6 TABLET | Refills: 0 | Status: SHIPPED | OUTPATIENT
Start: 2024-12-03 | End: 2024-12-08

## 2024-12-03 RX ORDER — LEVOCETIRIZINE DIHYDROCHLORIDE 5 MG/1
5 TABLET, FILM COATED ORAL EVERY EVENING
Qty: 14 TABLET | Refills: 0 | Status: SHIPPED | OUTPATIENT
Start: 2024-12-03 | End: 2024-12-17

## 2024-12-03 NOTE — PROGRESS NOTES
Subjective   Patient ID: Antonino Banks is a 63 y.o. male who presents for URI.    HPI patient is attended by phone visit because been having cough congestion with yellow sputum production going on for the past 4 days.  He has tried over-the-counter Mucinex and cough suppressant without any improvement in his symptoms.  He denies any fever, chills, shortness of breath, chest tightness and wheezing.He has history of  hypertension, traumatic brain injury, chronic low back pain secondary to lumbar canal stenosis, migraine headaches, ulceration of legs, coronary atherosclerosis, Ménière's disease BPH, hepatic steatosis,posttraumatic stress disorder and vertigo.          Review of Systems   Constitutional: Negative.    HENT:  Positive for congestion.    Eyes: Negative.    Respiratory:  Positive for cough.    Cardiovascular: Negative.    Gastrointestinal: Negative.    Endocrine: Negative.    Genitourinary: Negative.    Musculoskeletal: Negative.    Skin: Negative.    Allergic/Immunologic: Negative.    Neurological: Negative.    Hematological: Negative.    Psychiatric/Behavioral: Negative.         Objective   There were no vitals taken for this visit.    Physical Exam not done    Assessment/Plan   Assessment & Plan  Upper respiratory tract infection, unspecified type  Patient is encouraged drink fluids and take Tylenol for fever.  He is started on Z-Rene, Tessalon Perle and levocetirizine for upper respiratory infection.  He will continue home medication.  He is also advised to make follow-up appointment for annual physical examination.  Orders:    azithromycin (Zithromax) 250 mg tablet; Take 2 tablets (500 mg) by mouth once daily for 1 day, THEN 1 tablet (250 mg) once daily for 4 days. Take 2 tabs (500 mg) by mouth today, than 1 daily for 4 days..    benzonatate (Tessalon) 100 mg capsule; Take 1 capsule (100 mg) by mouth 3 times a day as needed for cough for up to 10 days. Do not crush or chew.    levocetirizine  (Xyzal) 5 mg tablet; Take 1 tablet (5 mg) by mouth once daily in the evening for 14 days.

## 2024-12-07 ASSESSMENT — ENCOUNTER SYMPTOMS
ENDOCRINE NEGATIVE: 1
PSYCHIATRIC NEGATIVE: 1
NEUROLOGICAL NEGATIVE: 1
CARDIOVASCULAR NEGATIVE: 1
CONSTITUTIONAL NEGATIVE: 1
HEMATOLOGIC/LYMPHATIC NEGATIVE: 1
EYES NEGATIVE: 1
GASTROINTESTINAL NEGATIVE: 1
MUSCULOSKELETAL NEGATIVE: 1
ALLERGIC/IMMUNOLOGIC NEGATIVE: 1
COUGH: 1